# Patient Record
Sex: FEMALE | Race: WHITE | Employment: OTHER
[De-identification: names, ages, dates, MRNs, and addresses within clinical notes are randomized per-mention and may not be internally consistent; named-entity substitution may affect disease eponyms.]

---

## 2017-07-29 ENCOUNTER — HEALTH MAINTENANCE LETTER (OUTPATIENT)
Age: 37
End: 2017-07-29

## 2018-08-05 ENCOUNTER — HEALTH MAINTENANCE LETTER (OUTPATIENT)
Age: 38
End: 2018-08-05

## 2019-09-22 ENCOUNTER — OFFICE VISIT (OUTPATIENT)
Dept: FAMILY MEDICINE | Facility: OTHER | Age: 39
End: 2019-09-22
Attending: NURSE PRACTITIONER
Payer: COMMERCIAL

## 2019-09-22 VITALS
OXYGEN SATURATION: 98 % | RESPIRATION RATE: 16 BRPM | BODY MASS INDEX: 23.32 KG/M2 | WEIGHT: 140 LBS | DIASTOLIC BLOOD PRESSURE: 84 MMHG | SYSTOLIC BLOOD PRESSURE: 132 MMHG | TEMPERATURE: 96.9 F | HEIGHT: 65 IN | HEART RATE: 104 BPM

## 2019-09-22 DIAGNOSIS — R39.89 URINARY PROBLEM: Primary | ICD-10-CM

## 2019-09-22 DIAGNOSIS — N30.00 ACUTE CYSTITIS WITHOUT HEMATURIA: ICD-10-CM

## 2019-09-22 LAB
ALBUMIN UR-MCNC: ABNORMAL MG/DL
APPEARANCE UR: ABNORMAL
BACTERIA #/AREA URNS HPF: ABNORMAL /HPF
BILIRUB UR QL STRIP: NEGATIVE
COLOR UR AUTO: YELLOW
GLUCOSE UR STRIP-MCNC: NEGATIVE MG/DL
HGB UR QL STRIP: ABNORMAL
KETONES UR STRIP-MCNC: ABNORMAL MG/DL
LEUKOCYTE ESTERASE UR QL STRIP: ABNORMAL
NITRATE UR QL: NEGATIVE
NON-SQ EPI CELLS #/AREA URNS LPF: ABNORMAL /LPF
PH UR STRIP: 6 PH (ref 5–9)
RBC #/AREA URNS AUTO: ABNORMAL /HPF
SOURCE: ABNORMAL
SP GR UR STRIP: 1.02 (ref 1–1.03)
UROBILINOGEN UR STRIP-ACNC: 1 EU/DL (ref 0.2–1)
WBC #/AREA URNS AUTO: ABNORMAL /HPF

## 2019-09-22 PROCEDURE — 81001 URINALYSIS AUTO W/SCOPE: CPT | Mod: ZL | Performed by: NURSE PRACTITIONER

## 2019-09-22 PROCEDURE — 99213 OFFICE O/P EST LOW 20 MIN: CPT | Performed by: NURSE PRACTITIONER

## 2019-09-22 PROCEDURE — G0463 HOSPITAL OUTPT CLINIC VISIT: HCPCS | Performed by: NURSE PRACTITIONER

## 2019-09-22 RX ORDER — SULFAMETHOXAZOLE/TRIMETHOPRIM 800-160 MG
1 TABLET ORAL 2 TIMES DAILY
Qty: 10 TABLET | Refills: 0 | Status: SHIPPED | OUTPATIENT
Start: 2019-09-22 | End: 2019-09-27

## 2019-09-22 ASSESSMENT — MIFFLIN-ST. JEOR: SCORE: 1320.92

## 2019-09-22 NOTE — PROGRESS NOTES
Nursing Notes:   Melissa Guzmán CMA  9/22/2019  3:08 PM  Signed  Patient presents to the clinic for burning with urination x 10 days. Patient reports having a history of UTIs.   Medication Reconciliation: complete    Melissa Guzmán CMA        SUBJECTIVE:   Gardenia Belle is a 39 year old female who presents to clinic today for the following health issues:    Patient [facility] for urinary tract infection symptoms.  She has had burning with urination for the last 10 days.  She is increased her fluids to help alleviate symptoms.  She denies fevers or chills, no abdominal pain, no hematuria.  She does have urinary urgency, dysuria and frequency.  She does take over-the-counter Azo for symptoms as well.  She denies continued STDs or pregnancy.  No vaginal discharge.      Problem list and histories reviewed & adjusted, as indicated.  Additional history: as documented    Patient Active Problem List   Diagnosis     Other acne     SUPERVIS NORMAL 2ND  PREG     Retinal disorder     Past Surgical History:   Procedure Laterality Date     C OPEN RX ANKLE DISLOCATN+FIXATN  2001       Social History     Tobacco Use     Smoking status: Current Every Day Smoker     Packs/day: 0.50     Years: 10.00     Pack years: 5.00     Types: Cigarettes     Smokeless tobacco: Never Used     Tobacco comment: 1/4 ppd  5-6 daily   Substance Use Topics     Alcohol use: No     Family History   Problem Relation Age of Onset     Diabetes Maternal Grandfather      Genitourinary Problems Maternal Grandfather      Heart Disease Maternal Grandfather      Cerebrovascular Disease Paternal Grandmother         young age     Cancer Maternal Grandmother         ovarain         Current Outpatient Medications   Medication Sig Dispense Refill     sulfamethoxazole-trimethoprim (BACTRIM DS/SEPTRA DS) 800-160 MG tablet Take 1 tablet by mouth 2 times daily for 5 days 10 tablet 0     Pumpkin Seed-Soy Germ (AZO BLADDER CONTROL/GO-LESS PO) Take  "2 tablets by mouth       Allergies   Allergen Reactions     No Known Drug Allergies          ROS:  Notable findings in the HPI.       OBJECTIVE:     /84 (BP Location: Left arm, Patient Position: Sitting, Cuff Size: Adult Regular)   Pulse 104   Temp 96.9  F (36.1  C) (Tympanic)   Resp 16   Ht 1.651 m (5' 5\")   Wt 63.5 kg (140 lb)   SpO2 98%   Breastfeeding? No   BMI 23.30 kg/m    Body mass index is 23.3 kg/m .  GENERAL: healthy, alert and no distress  EYES: Eyes grossly normal to inspection  RESP: Without increased work of breathing  CV: regular rates and rhythm and no peripheral edema  ABDOMEN: tenderness suprapubic, no organomegaly or masses and bowel sounds normal  PSYCH: mentation appears normal, affect normal/bright    Diagnostic Test Results:  Results for orders placed or performed in visit on 09/22/19 (from the past 24 hour(s))   Urinalysis w Reflex Microscopic If Positive   Result Value Ref Range    Color Urine Yellow     Appearance Urine Cloudy     Glucose Urine Negative NEG^Negative mg/dL    Bilirubin Urine Negative NEG^Negative    Ketones Urine Trace (A) NEG^Negative mg/dL    Specific Gravity Urine 1.025 1.000 - 1.030    Blood Urine Small (A) NEG^Negative    pH Urine 6.0 5.0 - 9.0 pH    Protein Albumin Urine Trace (A) NEG^Negative mg/dL    Urobilinogen Urine 1.0 0.2 - 1.0 EU/dL    Nitrite Urine Negative NEG^Negative    Leukocyte Esterase Urine Moderate (A) NEG^Negative    Source Unspecified Urine    Urine Microscopic   Result Value Ref Range    WBC Urine 10-25 (A) OTO5^0 - 5 /HPF    RBC Urine O - 2 OTO2^O - 2 /HPF    Squamous Epithelial /LPF Urine Few FEW^Few /LPF    Bacteria Urine Many (A) NEG^Negative /HPF       ASSESSMENT/PLAN:     1. Urinary problem  - Urinalysis w Reflex Microscopic If Positive  - Urine Microscopic    2. Acute cystitis without hematuria  - sulfamethoxazole-trimethoprim (BACTRIM DS/SEPTRA DS) 800-160 MG tablet; Take 1 tablet by mouth 2 times daily for 5 days  Dispense: 10 " tablet; Refill: 0      PLAN:    Given signs and symptoms, history and exam will treat for urinary tract infection.  We will also culture the urine and call if antibiotics need to be changed.    - Ensure you are drinking plenty of fluids, emptying your bladder when you feel the urge versus holding urine, after urinating you can bear-down to empty bladder completely, after peeing wipe front to back to avoid introducing bacteria towards vaginal area.   - Call or return to clinic prn if these symptoms worsen or fail to improve as anticipated.      Followup:    If not improving or if condition worsens, follow up with your Primary Care Provider    I explained my diagnostic considerations and recommendations to the patient, who voiced understanding and agreement with the treatment plan. All questions were answered. We discussed potential side effects of any prescribed or recommended therapies, as well as expectations for response to treatments. She was advised to contact our office if there is no improvement or worsening of conditions or symptoms.  If s/s worsen or persist, patient will either come back or follow up with PCP.    Disclaimer:  This note consists of words and symbols derived from keyboarding, dictation, or using voice recognition software. As a result, there may be errors in the script that have gone undetected. Please consider this when interpreting information found in this note.      Amber Valdez NP, 9/22/2019 3:08 PM

## 2019-09-22 NOTE — NURSING NOTE
Patient presents to the clinic for burning with urination x 10 days. Patient reports having a history of UTIs.   Medication Reconciliation: complete    Melissa Guzmán, CMA

## 2019-09-22 NOTE — PATIENT INSTRUCTIONS
"Patient Education     Bladder Infection, Female (Adult)  Urine is normally doesn't have any bacteria in it. But bacteria can get into the urinary tract from the skin around the rectum. Or they can travel in the blood from elsewhere in the body. Once they are in your urinary tract, they can cause infection in the urethra (urethritis), the bladder (cystitis), or the kidneys (pyelonephritis).  The most common place for an infection is in the bladder. This is called a bladder infection. This is one of the most common infections in women. Most bladder infections are easily treated. They are not serious unless the infection spreads to the kidney.  The phrases \"bladder infection,\" \"UTI,\" and \"cystitis\" are often used to describe the same thing. But they are not always the same. Cystitis is an inflammation of the bladder. The most common cause of cystitis is an infection.  Symptoms  The infection causes inflammation in the urethra and bladder. This causes many of the symptoms. The most common symptoms of a bladder infection are:    Pain or burning when urinating    Having to urinate more often than usual    Urgent need to urinate    Only a small amount of urine comes out    Blood in urine    Abdominal discomfort. This is usually in the lower abdomen above the pubic bone.    Cloudy urine    Strong- or bad-smelling urine    Unable to urinate (urinary retention)    Unable to hold urine in (urinary incontinence)    Fever    Loss of appetite    Confusion (in older adults)  Causes  Bladder infections are not contagious. You can't get one from someone else, from a toilet seat, or from sharing a bath.  The most common cause of bladder infections is bacteria from the bowels. The bacteria get onto the skin around the opening of the urethra. From there, they can get into the urine and travel up to the bladder, causing inflammation and infection. This usually happens because of:    Wiping improperly after urinating. Always wipe from " front to back.    Bowel incontinence    Pregnancy    Procedures such as having a catheter inserted    Older age    Not emptying your bladder. This can allow bacteria a chance to grow in your urine.    Dehydration    Constipation    Sex    Use of a diaphragm for birth control   Treatment  Bladder infections are diagnosed by a urine test. They are treated with antibiotics and usually clear up quickly without complications. Treatment helps prevent a more serious kidney infection.  Medicines  Medicines can help in the treatment of a bladder infection:    Take antibiotics until they are used up, even if you feel better. It is important to finish them to make sure the infection has cleared.    You can use acetaminophen or ibuprofen for pain, fever, or discomfort, unless another medicine was prescribed. If you have chronic liver or kidney disease, talk with your healthcare provider before using these medicines. Also talk with your provider if you've ever had a stomach ulcer or gastrointestinal bleeding, or are taking blood-thinner medicines.    If you are given phenazopydridine to reduce burning with urination, it will cause your urine to become a bright orange color. This can stain clothing.  Care and prevention  These self-care steps can help prevent future infections:    Drink plenty of fluids to prevent dehydration and flush out your bladder. Do this unless you must restrict fluids for other health reasons, or your doctor told you not to.    Proper cleaning after going to the bathroom is important. Wipe from front to back after using the toilet to prevent the spread of bacteria.    Urinate more often. Don't try to hold urine in for a long time.    Wear loose-fitting clothes and cotton underwear. Avoid tight-fitting pants.    Improve your diet and prevent constipation. Eat more fresh fruit and vegetables, and fiber, and less junk and fatty foods.    Avoid sex until your symptoms are gone.    Avoid caffeine, alcohol, and  spicy foods. These can irritate your bladder.    Urinate right after intercourse to flush out your bladder.    If you use birth control pills and have frequent bladder infections, discuss it with your doctor.  Follow-up care  Call your healthcare provider if all symptoms are not gone after 3 days of treatment. This is especially important if you have repeat infections.  If a culture was done, you will be told if your treatment needs to be changed. If directed, you can call to find out the results.  If X-rays were done, you will be told if the results will affect your treatment.  Call 911  Call 911 if any of the following occur:    Trouble breathing    Hard to wake up or confusion    Fainting or loss of consciousness    Rapid heart rate  When to seek medical advice  Call your healthcare provider right away if any of these occur:    Fever of 100.4 F (38.0 C) or higher, or as directed by your healthcare provider    Symptoms are not better by the third day of treatment    Back or belly (abdominal) pain that gets worse    Repeated vomiting, or unable to keep medicine down    Weakness or dizziness    Vaginal discharge    Pain, redness, or swelling in the outer vaginal area (labia)

## 2019-11-04 ENCOUNTER — HEALTH MAINTENANCE LETTER (OUTPATIENT)
Age: 39
End: 2019-11-04

## 2020-11-22 ENCOUNTER — HEALTH MAINTENANCE LETTER (OUTPATIENT)
Age: 40
End: 2020-11-22

## 2021-09-18 ENCOUNTER — HEALTH MAINTENANCE LETTER (OUTPATIENT)
Age: 41
End: 2021-09-18

## 2022-01-08 ENCOUNTER — HEALTH MAINTENANCE LETTER (OUTPATIENT)
Age: 42
End: 2022-01-08

## 2022-11-20 ENCOUNTER — HEALTH MAINTENANCE LETTER (OUTPATIENT)
Age: 42
End: 2022-11-20

## 2023-04-15 ENCOUNTER — HEALTH MAINTENANCE LETTER (OUTPATIENT)
Age: 43
End: 2023-04-15

## 2024-02-03 ENCOUNTER — HEALTH MAINTENANCE LETTER (OUTPATIENT)
Age: 44
End: 2024-02-03